# Patient Record
Sex: MALE | Race: WHITE | NOT HISPANIC OR LATINO
[De-identification: names, ages, dates, MRNs, and addresses within clinical notes are randomized per-mention and may not be internally consistent; named-entity substitution may affect disease eponyms.]

---

## 2021-12-17 ENCOUNTER — APPOINTMENT (OUTPATIENT)
Dept: HEART AND VASCULAR | Facility: CLINIC | Age: 66
End: 2021-12-17

## 2021-12-17 PROBLEM — Z00.00 ENCOUNTER FOR PREVENTIVE HEALTH EXAMINATION: Status: ACTIVE | Noted: 2021-12-17

## 2021-12-21 ENCOUNTER — APPOINTMENT (OUTPATIENT)
Dept: HEART AND VASCULAR | Facility: CLINIC | Age: 66
End: 2021-12-21
Payer: COMMERCIAL

## 2021-12-21 VITALS
OXYGEN SATURATION: 99 % | BODY MASS INDEX: 31.81 KG/M2 | HEART RATE: 52 BPM | TEMPERATURE: 97.6 F | WEIGHT: 240 LBS | HEIGHT: 73 IN | SYSTOLIC BLOOD PRESSURE: 180 MMHG | DIASTOLIC BLOOD PRESSURE: 79 MMHG

## 2021-12-21 DIAGNOSIS — Z78.9 OTHER SPECIFIED HEALTH STATUS: ICD-10-CM

## 2021-12-21 DIAGNOSIS — I42.8 OTHER CARDIOMYOPATHIES: ICD-10-CM

## 2021-12-21 DIAGNOSIS — I48.19 OTHER PERSISTENT ATRIAL FIBRILLATION: ICD-10-CM

## 2021-12-21 DIAGNOSIS — I50.42 CHRONIC COMBINED SYSTOLIC (CONGESTIVE) AND DIASTOLIC (CONGESTIVE) HEART FAILURE: ICD-10-CM

## 2021-12-21 PROCEDURE — 99204 OFFICE O/P NEW MOD 45 MIN: CPT

## 2022-03-28 PROBLEM — Z78.9 NON-SMOKER: Status: ACTIVE | Noted: 2022-03-28

## 2022-03-28 PROBLEM — I50.42 CHRONIC COMBINED SYSTOLIC AND DIASTOLIC CONGESTIVE HEART FAILURE: Status: ACTIVE | Noted: 2022-03-28

## 2022-03-28 PROBLEM — I48.19 PERSISTENT ATRIAL FIBRILLATION: Status: ACTIVE | Noted: 2022-03-28

## 2022-03-28 PROBLEM — I42.8 CARDIOMYOPATHY, NONISCHEMIC: Status: ACTIVE | Noted: 2022-03-28

## 2022-03-28 RX ORDER — CARVEDILOL 6.25 MG/1
6.25 TABLET, FILM COATED ORAL
Refills: 0 | Status: ACTIVE | COMMUNITY

## 2022-03-28 RX ORDER — AMIODARONE HYDROCHLORIDE 200 MG/1
200 TABLET ORAL
Refills: 0 | Status: ACTIVE | COMMUNITY

## 2022-03-28 RX ORDER — FUROSEMIDE 40 MG/1
40 TABLET ORAL
Refills: 0 | Status: ACTIVE | COMMUNITY

## 2022-03-28 RX ORDER — APIXABAN 5 MG/1
5 TABLET, FILM COATED ORAL
Refills: 0 | Status: ACTIVE | COMMUNITY

## 2022-03-28 NOTE — HISTORY OF PRESENT ILLNESS
[FreeTextEntry1] : 66-year-old male with persistent atrial fibrillation, cardiomyopathy.\par He was initially diagnosed with atrial fibrillation over 10 years ago and was briefly on a medication.  Noted significant alcohol use at that time.  He had done well after that time until earlier in 2021 when he noted more dyspnea with exertion.  He presented to urgent care on 9/30/2021 with chest tightness and dyspnea and was noted to be in rapid atrial fibrillation.\par Echo October 2021 showed EF of 25%, mild AI, decreased RV function, and pulmonary hypertension.\par EKG October 2021 showed atrial fibrillation 100 bpm with lateral T wave inversions.\par Nuclear stress test October 2021 showed apical defect, infarction versus physiological apical thinning.\par More recently, he had Covid infection.  He feels pretty good now.  \par He has cut down his alcohol intake over the past few years.\par Family history is notable for father with pacemaker.\par Echocardiogram was done today.  Review of images shows improvement of LV function to low normal, with mild mitral regurgitation.

## 2022-03-28 NOTE — PHYSICAL EXAM
[Well Developed] : well developed [Well Nourished] : well nourished [No Acute Distress] : no acute distress [Normal S1, S2] : normal S1, S2 [No Murmur] : no murmur [Clear Lung Fields] : clear lung fields [Good Air Entry] : good air entry [No Respiratory Distress] : no respiratory distress  [Soft] : abdomen soft [Non Tender] : non-tender [Normal Gait] : normal gait [No Edema] : no edema [No Rash] : no rash [Moves all extremities] : moves all extremities [Alert and Oriented] : alert and oriented

## 2022-03-28 NOTE — REVIEW OF SYSTEMS
[Fever] : no fever [Chills] : no chills [Dyspnea on exertion] : not dyspnea during exertion [Chest Discomfort] : no chest discomfort [Palpitations] : no palpitations [Orthopnea] : no orthopnea [Abdominal Pain] : no abdominal pain [Cough] : no cough [Nausea] : no nausea [Vomiting] : no vomiting

## 2022-06-07 ENCOUNTER — APPOINTMENT (OUTPATIENT)
Dept: HEART AND VASCULAR | Facility: CLINIC | Age: 67
End: 2022-06-07